# Patient Record
Sex: FEMALE | Race: WHITE | NOT HISPANIC OR LATINO | Employment: OTHER | URBAN - METROPOLITAN AREA
[De-identification: names, ages, dates, MRNs, and addresses within clinical notes are randomized per-mention and may not be internally consistent; named-entity substitution may affect disease eponyms.]

---

## 2024-01-12 ENCOUNTER — OFFICE VISIT (OUTPATIENT)
Dept: CARDIOLOGY CLINIC | Facility: CLINIC | Age: 79
End: 2024-01-12
Payer: COMMERCIAL

## 2024-01-12 VITALS
DIASTOLIC BLOOD PRESSURE: 52 MMHG | BODY MASS INDEX: 32.98 KG/M2 | HEIGHT: 64 IN | HEART RATE: 67 BPM | SYSTOLIC BLOOD PRESSURE: 112 MMHG | WEIGHT: 193.2 LBS

## 2024-01-12 DIAGNOSIS — E66.9 OBESITY (BMI 30.0-34.9): ICD-10-CM

## 2024-01-12 DIAGNOSIS — Z76.89 ENCOUNTER TO ESTABLISH CARE: ICD-10-CM

## 2024-01-12 DIAGNOSIS — I10 PRIMARY HYPERTENSION: Primary | ICD-10-CM

## 2024-01-12 PROCEDURE — 93000 ELECTROCARDIOGRAM COMPLETE: CPT | Performed by: INTERNAL MEDICINE

## 2024-01-12 PROCEDURE — 99204 OFFICE O/P NEW MOD 45 MIN: CPT | Performed by: INTERNAL MEDICINE

## 2024-01-12 RX ORDER — AMLODIPINE BESYLATE 10 MG/1
1 TABLET ORAL DAILY
COMMUNITY
Start: 2023-07-24

## 2024-01-12 RX ORDER — OMEPRAZOLE 40 MG/1
1 CAPSULE, DELAYED RELEASE ORAL DAILY
COMMUNITY
Start: 2023-09-11

## 2024-01-12 RX ORDER — CHLORTHALIDONE 25 MG/1
25 TABLET ORAL DAILY
COMMUNITY
Start: 2023-10-20 | End: 2024-10-19

## 2024-01-12 NOTE — PATIENT INSTRUCTIONS
You were seen today in the Cardiology office for cardiac evaluation.     Please continue your current cardiac medications as prescribed.    [facility]    Please call our office or use [employer] with any questions.

## 2024-01-12 NOTE — PROGRESS NOTES
[facility]    Name:Heide Dominique   DOS: 1/12/2024     Chief Complaint:   Chief Complaint   Patient presents with    Advice Only     No cardiac complaints.     HISTORY OF PRESENT ILLNESS:      HPI:  Heide Dominique is a 78 y.o. female. She  has no past medical history on file.    She presents to establish care with a cardiologist.  The patient is clinically deaf, and therefore the encounter this is related by iPad interpretor [id number] #[id number].  The patient reports no active cardiopulmonary complaints, and upon  Discussion she specifically denies chest pain, diaphoresis, dizziness, palpitations, orthopnea, edema.  She has had no syncopal episodes.  Upon further review of systems, does admit to feeling short of breath with significant exertion beyond her normal day-to-day activities.  No shortness of breath with daily activities.  Reports that she is not very active physically, avoids exercise because she is not interested in it.  Does have a history of hypertension, chronically maintained on amlodipine 10 mg once daily in addition to chlorthalidone 25 mg once daily with overall good control.  Presently lives with her sister, who is also clinically deaf.    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  Her sister, is also clinically deaf, reportedly after emmanuel chickenpox as a child.  There is no known family history of heart disease that the patient is aware of.    She is a non-smoker.         ROS    ROS: Pertinent positives and negatives as described in History of Present Illness. Remainder of a 14 point review of systems was negative.     No Known Allergies     Current Outpatient Medications on File Prior to Visit   Medication Sig Dispense Refill    amLODIPine (NORVASC) 10 mg tablet Take 1 tablet by mouth daily      chlorthalidone 25 mg tablet Take 25 mg by mouth daily      omeprazole (PriLOSEC) 40 MG capsule Take 1  "capsule by mouth daily       No current facility-administered medications on file prior to visit.       Social History     Socioeconomic History    Marital status: Single     Spouse name: Not on file    Number of children: Not on file    Years of education: Not on file    Highest education level: Not on file   Occupational History    Not on file   Tobacco Use    Smoking status: Not on file    Smokeless tobacco: Not on file   Substance and Sexual Activity    Alcohol use: Not on file    Drug use: Not on file    Sexual activity: Not on file   Other Topics Concern    Not on file   Social History Narrative    Not on file     Social Determinants of Health     Financial Resource Strain: Not on file   Food Insecurity: Not on file   Transportation Needs: Not on file   Physical Activity: Not on file   Stress: Not on file   Social Connections: Not on file   Intimate Partner Violence: Not on file   Housing Stability: Not on file       OBJECTIVE:    /52 (BP Location: Right arm, Patient Position: Sitting, Cuff Size: Large)   Pulse 67   Ht 5' 4\" (1.626 m)   Wt 87.6 kg (193 lb 3.2 oz)   BMI 33.16 kg/m²      BP Readings from Last 3 Encounters:   01/12/24 112/52       Wt Readings from Last 3 Encounters:   01/12/24 87.6 kg (193 lb 3.2 oz)         Physical Exam  Vitals reviewed.   Constitutional:       General: She is not in acute distress.     Appearance: Normal appearance. She is obese. She is not diaphoretic.   HENT:      Head: Normocephalic and atraumatic.      Right Ear: Decreased hearing noted.      Left Ear: Decreased hearing noted.   Eyes:      Conjunctiva/sclera: Conjunctivae normal.   Neck:      Vascular: No carotid bruit or JVD.   Cardiovascular:      Rate and Rhythm: Normal rate and regular rhythm.      Pulses: Normal pulses.      Heart sounds: Normal heart sounds. No murmur heard.     No friction rub. No gallop.   Pulmonary:      Effort: Pulmonary effort is normal.      Breath sounds: Normal breath sounds. No " wheezing, rhonchi or rales.   Abdominal:      General: Abdomen is flat. Bowel sounds are normal.   Musculoskeletal:      Right lower leg: No edema.      Left lower leg: No edema.   Skin:     General: Skin is warm and dry.   Neurological:      Mental Status: She is alert.   Psychiatric:         Mood and Affect: Mood normal.         Behavior: Behavior normal.                                                       Cardiac testing:   EKG reviewed personally: Sinus rhythm.  Incomplete right bundle branch block.  Nonspecific ST abnormality.        ASSESSMENT/PLAN:  Diagnoses and all orders for this visit:    Primary hypertension  -     POCT ECG    Encounter to establish care  -     POCT ECG    Obesity (BMI 30.0-34.9)    Other orders  -     amLODIPine (NORVASC) 10 mg tablet; Take 1 tablet by mouth daily  -     chlorthalidone 25 mg tablet; Take 25 mg by mouth daily  -     omeprazole (PriLOSEC) 40 MG capsule; Take 1 capsule by mouth daily      78-year-old female presenting to establish care with a cardiologist for general evaluation.  She has no cardiac complaints, states that she feels well.  Does have decreased functional capacity due to deconditioning, and I encouraged her to start walking 30 minutes daily as a form of exercise at least 5 days/week to improve overall conditioning.  Ports that she does have cholesterol levels checked by her PCP regularly, states that they are normal.  Does not monitor her blood pressure regularly, was encouraged to get a blood pressure cuff and start doing this.  Blood pressure in office today is well-controlled, and I recommended continuing amlodipine 10 mg once daily in addition to chlorthalidone 25 mg once daily.  No additional cardiac workup anticipated at this time.  Patient advised to follow-up in 1 year or on an as-needed basis, may be seen sooner as needed.        Jose R Rangel MD      Portions of the record may have been created with voice recognition software. Occasional  "wrong word or \"sound alike\" substitutions may have occurred due to the inherent limitations of voice recognition software. Please review the chart carefully and recognize, using context, where substitutions/typographical errors may have occurred.     "